# Patient Record
Sex: FEMALE | URBAN - METROPOLITAN AREA
[De-identification: names, ages, dates, MRNs, and addresses within clinical notes are randomized per-mention and may not be internally consistent; named-entity substitution may affect disease eponyms.]

---

## 2024-05-25 ENCOUNTER — OFFICE VISIT (OUTPATIENT)
Dept: URGENT CARE | Facility: CLINIC | Age: 34
End: 2024-05-25
Payer: COMMERCIAL

## 2024-05-25 VITALS
HEIGHT: 64 IN | SYSTOLIC BLOOD PRESSURE: 127 MMHG | HEART RATE: 70 BPM | TEMPERATURE: 97.6 F | OXYGEN SATURATION: 98 % | BODY MASS INDEX: 34.83 KG/M2 | RESPIRATION RATE: 14 BRPM | DIASTOLIC BLOOD PRESSURE: 77 MMHG | WEIGHT: 204 LBS

## 2024-05-25 DIAGNOSIS — L03.213 PERIORBITAL CELLULITIS OF LEFT EYE: ICD-10-CM

## 2024-05-25 DIAGNOSIS — H10.32 ACUTE BACTERIAL CONJUNCTIVITIS OF LEFT EYE: Primary | ICD-10-CM

## 2024-05-25 PROCEDURE — 99204 OFFICE O/P NEW MOD 45 MIN: CPT | Performed by: PHYSICIAN ASSISTANT

## 2024-05-25 PROCEDURE — 1036F TOBACCO NON-USER: CPT | Performed by: PHYSICIAN ASSISTANT

## 2024-05-25 RX ORDER — ROSUVASTATIN CALCIUM 20 MG/1
20 TABLET, COATED ORAL DAILY
COMMUNITY
Start: 2024-04-25

## 2024-05-25 RX ORDER — CEFPODOXIME PROXETIL 200 MG/1
200 TABLET, FILM COATED ORAL 2 TIMES DAILY
Qty: 20 TABLET | Refills: 0 | Status: SHIPPED | OUTPATIENT
Start: 2024-05-25 | End: 2024-06-04

## 2024-05-25 RX ORDER — PREDNISONE 10 MG/1
30 TABLET ORAL DAILY
Qty: 15 TABLET | Refills: 0 | Status: SHIPPED | OUTPATIENT
Start: 2024-05-25 | End: 2024-05-30

## 2024-05-25 RX ORDER — DEXTROAMPHETAMINE SACCHARATE, AMPHETAMINE ASPARTATE MONOHYDRATE, DEXTROAMPHETAMINE SULFATE AND AMPHETAMINE SULFATE 2.5; 2.5; 2.5; 2.5 MG/1; MG/1; MG/1; MG/1
10 CAPSULE, EXTENDED RELEASE ORAL EVERY MORNING
COMMUNITY
Start: 2024-04-25

## 2024-05-25 RX ORDER — POLYMYXIN B SULFATE AND TRIMETHOPRIM 1; 10000 MG/ML; [USP'U]/ML
1 SOLUTION OPHTHALMIC EVERY 4 HOURS
Qty: 10 ML | Refills: 0 | Status: SHIPPED | OUTPATIENT
Start: 2024-05-25 | End: 2024-06-04

## 2024-05-25 ASSESSMENT — ENCOUNTER SYMPTOMS
FATIGUE: 0
HEADACHES: 1
APPETITE CHANGE: 0
ACTIVITY CHANGE: 0
SORE THROAT: 0
PSYCHIATRIC NEGATIVE: 1
WOUND: 0
VOMITING: 0
NAUSEA: 0
COUGH: 0
EYE DISCHARGE: 1
FEVER: 0
ALLERGIC/IMMUNOLOGIC NEGATIVE: 1
COLOR CHANGE: 0
BLOOD IN STOOL: 0
ENDOCRINE NEGATIVE: 1
RHINORRHEA: 0
SHORTNESS OF BREATH: 0
EYE PAIN: 1
DIARRHEA: 0
ABDOMINAL PAIN: 0
SINUS PRESSURE: 0
EYE REDNESS: 1
HEMATOLOGIC/LYMPHATIC NEGATIVE: 1
PHOTOPHOBIA: 0

## 2024-05-25 ASSESSMENT — PAIN SCALES - GENERAL: PAINLEVEL: 7

## 2024-05-25 NOTE — PATIENT INSTRUCTIONS
Assessment/Plan   Problem List Items Addressed This Visit       Acute bacterial conjunctivitis of left eye - Primary    Relevant Medications    polymyxin B sulf-trimethoprim (Polytrim) ophthalmic solution    Periorbital cellulitis of left eye    Relevant Medications    cefpodoxime (Vantin) 200 mg tablet    predniSONE (Deltasone) 10 mg tablet   Copious mucoid discharge from the left eye at time of exam vision generally intact 20/50 OD 20/40 OS 20/40 both eyes  The skin around the periorbital region is somewhat erythematous without any obvious breaks in the skin.  I am concerned about the possibility of early periorbital cellulitis and I am starting the patient on cefpodoxime for coverage as she has a mild penicillin allergy.  In addition to this I am sending Polytrim drops for the eye.  Prednisone to help with the swelling.  Recommending prompt follow-up with primary care here in the next week.  If there is any significant worsening patient should be seen at the emergency room for further evaluation and management.  I discussed with patient that if there is any worsening of the pain any worsening vision, or any onset of fevers, onset of headache, patient should be immediately evaluated at the emergency room

## 2024-05-25 NOTE — PROGRESS NOTES
"Subjective   Patient ID: Sara Garcia is a 33 y.o. female who presents for Eye Problem (Left Eye irritation, discharge and swelling x1 day.).  Patient notes blurry vision but states that this is only from the discharge when she wipes the discharge blurry vision improves.  Patient denies any trauma to the eye.  Denies any noticing anything getting in her eye.  Notes that symptoms have worsened considerably over the course of the last 24 hours.  She denies any fevers or chills.  Does report that the eye is somewhat irritated and painful.  Notes that the eye was stuck shut this morning and she reports mucus-like discharge    Review of Systems   Constitutional:  Negative for activity change, appetite change, fatigue and fever.   HENT:  Negative for congestion, rhinorrhea, sinus pressure and sore throat.    Eyes:  Positive for pain, discharge and redness. Negative for photophobia and visual disturbance.   Respiratory:  Negative for cough and shortness of breath.    Cardiovascular:  Negative for chest pain and leg swelling.   Gastrointestinal:  Negative for abdominal pain, blood in stool, diarrhea, nausea and vomiting.   Endocrine: Negative.    Skin:  Negative for color change, rash and wound.   Allergic/Immunologic: Negative.    Neurological:  Positive for headaches.   Hematological: Negative.    Psychiatric/Behavioral: Negative.         Objective   /77   Pulse 70   Temp 36.4 °C (97.6 °F) (Temporal)   Resp 14   Ht 1.626 m (5' 4\")   Wt 92.5 kg (204 lb)   LMP 05/10/2024 (Exact Date)   SpO2 98%   BMI 35.02 kg/m²   Physical Exam  Constitutional:       Appearance: Normal appearance.   HENT:      Head: Normocephalic and atraumatic.      Mouth/Throat:      Mouth: Mucous membranes are moist.      Pharynx: Oropharynx is clear.   Eyes:      General:         Right eye: No foreign body, discharge or hordeolum.         Left eye: Discharge present.No foreign body or hordeolum.      Extraocular Movements:      Right eye: " Normal extraocular motion.      Left eye: Normal extraocular motion.      Conjunctiva/sclera:      Right eye: Right conjunctiva is not injected. No exudate or hemorrhage.     Left eye: Left conjunctiva is injected. No exudate or hemorrhage.     Comments: Anterior chambers are clear bilaterally.  The left eyes noted to be quite injected with limbal sparing.  The left eye was examined with fluorescein under Woods lamp diffuse fluorescein uptake without any visualized corneal abrasions, no dendritic lesions   Cardiovascular:      Rate and Rhythm: Normal rate and regular rhythm.   Pulmonary:      Effort: Pulmonary effort is normal.      Breath sounds: Normal breath sounds.   Lymphadenopathy:      Cervical: No cervical adenopathy.   Neurological:      Mental Status: She is alert.         Assessment/Plan   Problem List Items Addressed This Visit       Acute bacterial conjunctivitis of left eye - Primary    Relevant Medications    polymyxin B sulf-trimethoprim (Polytrim) ophthalmic solution    Periorbital cellulitis of left eye    Relevant Medications    cefpodoxime (Vantin) 200 mg tablet    predniSONE (Deltasone) 10 mg tablet   Copious mucoid discharge from the left eye at time of exam vision generally intact 20/50 OD 20/40 OS 20/40 both eyes  The skin around the periorbital region is somewhat erythematous without any obvious breaks in the skin.  I am concerned about the possibility of early periorbital cellulitis and I am starting the patient on cefpodoxime for coverage as she has a mild penicillin allergy.  In addition to this I am sending Polytrim drops for the eye.  Prednisone to help with the swelling.  Recommending prompt follow-up with primary care here in the next week.  If there is any significant worsening patient should be seen at the emergency room for further evaluation and management.  I discussed with patient that if there is any worsening of the pain any worsening vision, or any onset of fevers, onset of  headache, patient should be immediately evaluated at the emergency room